# Patient Record
Sex: FEMALE | Race: BLACK OR AFRICAN AMERICAN | NOT HISPANIC OR LATINO | URBAN - METROPOLITAN AREA
[De-identification: names, ages, dates, MRNs, and addresses within clinical notes are randomized per-mention and may not be internally consistent; named-entity substitution may affect disease eponyms.]

---

## 2020-06-11 ENCOUNTER — OFFICE VISIT (OUTPATIENT)
Dept: URBAN - METROPOLITAN AREA CLINIC 82 | Facility: CLINIC | Age: 60
End: 2020-06-11
Payer: COMMERCIAL

## 2020-06-11 DIAGNOSIS — Z12.11 SCREENING FOR COLON CANCER: ICD-10-CM

## 2020-06-11 DIAGNOSIS — K75.4 AUTOIMMUNE HEPATITIS: ICD-10-CM

## 2020-06-11 PROBLEM — 305058001: Status: ACTIVE | Noted: 2020-06-11

## 2020-06-11 PROCEDURE — 99213 OFFICE O/P EST LOW 20 MIN: CPT | Performed by: INTERNAL MEDICINE

## 2020-06-11 RX ORDER — AZATHIOPRINE 50 MG/1
TAKE 1 TABLET (50 MG) BY ORAL ROUTE ONCE DAILY FOR 30 DAYS TABLET ORAL 1
Qty: 30 | Refills: 6 | Status: ACTIVE | COMMUNITY
Start: 2020-03-10 | End: 2020-10-06

## 2020-06-11 RX ORDER — PREDNISONE 5 MG/1
1 TABLET TABLET ORAL ONCE A DAY
Qty: 30 | Refills: 6

## 2020-06-11 RX ORDER — SODIUM, POTASSIUM,MAG SULFATES 17.5-3.13G
354 ML SOLUTION, RECONSTITUTED, ORAL ORAL
Qty: 354 ML | Refills: 0 | OUTPATIENT
Start: 2020-06-11

## 2020-06-11 RX ORDER — AZATHIOPRINE 50 MG/1
TAKE 1 TABLET (50 MG) BY ORAL ROUTE ONCE DAILY FOR 30 DAYS TABLET ORAL 1
Qty: 30 | Refills: 6
Start: 2020-03-10

## 2020-06-11 RX ORDER — AMLODIPINE BESYLATE 10 MG/1
TAKE 1 TABLET (10 MG) BY ORAL ROUTE ONCE DAILY TABLET ORAL 1
Qty: 0 | Refills: 0 | Status: ACTIVE | COMMUNITY
Start: 1900-01-01

## 2020-06-11 NOTE — HPI-TODAY'S VISIT:
following up today for autoimmune hepatitis.  doing well.  good energy.  compliant with medications.  has put on 20 lbs since last visit.

## 2020-06-15 LAB
A/G RATIO: 1.4
ACTIN (SMOOTH MUSCLE) ANTIBODY: 37
ALBUMIN: 4.2
ALKALINE PHOSPHATASE: 79
AST (SGOT): 19
BASO (ABSOLUTE): 0
BASOS: 1
BILIRUBIN, TOTAL: 0.7
BUN/CREATININE RATIO: 11
BUN: 10
CALCIUM: 9.3
CARBON DIOXIDE, TOTAL: 24
CHLORIDE: 102
COMMENT:: (no result)
CREATININE: 0.94
EGFR IF AFRICN AM: 76
EGFR IF NONAFRICN AM: 66
EOS (ABSOLUTE): 0.1
EOS: 1
GLOBULIN, TOTAL: 3.1
GLUCOSE: 106
HBSAG SCREEN: NEGATIVE
HCV AB: <0.1
HEMATOCRIT: 37.7
HEMATOLOGY COMMENTS:: (no result)
HEMOGLOBIN: 12.2
HEP B SURFACE AB, QUAL: REACTIVE
IGG, SUBCLASS 1: 963
IGG, SUBCLASS 2: 165
IGG, SUBCLASS 3: 176
IGG, SUBCLASS 4: 60
IMMATURE CELLS: (no result)
IMMATURE GRANS (ABS): 0
IMMATURE GRANULOCYTES: 0
IMMUNOGLOBULIN G, QN, SERUM: 1350
LYMPHS (ABSOLUTE): 1.1
LYMPHS: 26
MCH: 25.3
MCHC: 32.4
MCV: 78
MONOCYTES(ABSOLUTE): 0.5
MONOCYTES: 11
NEUTROPHILS (ABSOLUTE): 2.7
NEUTROPHILS: 61
NRBC: (no result)
PLATELETS: 316
POTASSIUM: 3.9
PROTEIN, TOTAL: 7.3
RBC: 4.83
RDW: 14.1
RPR: NON REACTIVE
SODIUM: 139
WBC: 4.4

## 2020-06-24 ENCOUNTER — TELEPHONE ENCOUNTER (OUTPATIENT)
Dept: URBAN - METROPOLITAN AREA CLINIC 92 | Facility: CLINIC | Age: 60
End: 2020-06-24

## 2020-07-01 ENCOUNTER — CLAIMS CREATED FROM THE CLAIM WINDOW (OUTPATIENT)
Dept: URBAN - METROPOLITAN AREA CLINIC 4 | Facility: CLINIC | Age: 60
End: 2020-07-01
Payer: COMMERCIAL

## 2020-07-01 ENCOUNTER — OFFICE VISIT (OUTPATIENT)
Dept: URBAN - METROPOLITAN AREA SURGERY CENTER 13 | Facility: SURGERY CENTER | Age: 60
End: 2020-07-01
Payer: COMMERCIAL

## 2020-07-01 DIAGNOSIS — Z12.11 COLON CANCER SCREENING: ICD-10-CM

## 2020-07-01 DIAGNOSIS — D12.5 ADENOMATOUS POLYP OF SIGMOID COLON: ICD-10-CM

## 2020-07-01 DIAGNOSIS — D12.1 BENIGN NEOPLASM OF APPENDIX: ICD-10-CM

## 2020-07-01 DIAGNOSIS — D12.0 BENIGN NEOPLASM OF CECUM: ICD-10-CM

## 2020-07-01 DIAGNOSIS — D12.3 ADENOMA OF TRANSVERSE COLON: ICD-10-CM

## 2020-07-01 DIAGNOSIS — D12.3 BENIGN NEOPLASM OF TRANSVERSE COLON: ICD-10-CM

## 2020-07-01 DIAGNOSIS — D12.5 BENIGN NEOPLASM OF SIGMOID COLON: ICD-10-CM

## 2020-07-01 PROCEDURE — 45380 COLONOSCOPY AND BIOPSY: CPT | Performed by: INTERNAL MEDICINE

## 2020-07-01 PROCEDURE — G8907 PT DOC NO EVENTS ON DISCHARG: HCPCS | Performed by: INTERNAL MEDICINE

## 2020-07-01 PROCEDURE — G9933 CANC DETECTD DURING COL SCRN: HCPCS | Performed by: INTERNAL MEDICINE

## 2020-07-01 PROCEDURE — 88305 TISSUE EXAM BY PATHOLOGIST: CPT | Performed by: PATHOLOGY

## 2020-07-01 PROCEDURE — 45385 COLONOSCOPY W/LESION REMOVAL: CPT | Performed by: INTERNAL MEDICINE

## 2021-10-15 ENCOUNTER — OFFICE VISIT (OUTPATIENT)
Dept: URBAN - METROPOLITAN AREA CLINIC 42 | Facility: CLINIC | Age: 61
End: 2021-10-15
Payer: COMMERCIAL

## 2021-10-15 ENCOUNTER — WEB ENCOUNTER (OUTPATIENT)
Dept: URBAN - METROPOLITAN AREA CLINIC 42 | Facility: CLINIC | Age: 61
End: 2021-10-15

## 2021-10-15 VITALS
TEMPERATURE: 97.3 F | WEIGHT: 184 LBS | HEIGHT: 59 IN | SYSTOLIC BLOOD PRESSURE: 148 MMHG | DIASTOLIC BLOOD PRESSURE: 84 MMHG | HEART RATE: 87 BPM | BODY MASS INDEX: 37.09 KG/M2 | RESPIRATION RATE: 20 BRPM

## 2021-10-15 DIAGNOSIS — K75.4 AUTOIMMUNE HEPATITIS: ICD-10-CM

## 2021-10-15 PROCEDURE — 99214 OFFICE O/P EST MOD 30 MIN: CPT | Performed by: INTERNAL MEDICINE

## 2021-10-15 RX ORDER — SODIUM, POTASSIUM,MAG SULFATES 17.5-3.13G
354 ML SOLUTION, RECONSTITUTED, ORAL ORAL
Qty: 354 ML | Refills: 0 | Status: ACTIVE | COMMUNITY
Start: 2020-06-11

## 2021-10-15 RX ORDER — AZATHIOPRINE 50 MG
AS DIRECTED TABLET ORAL DAILY
Qty: 90 | Refills: 3 | OUTPATIENT

## 2021-10-15 RX ORDER — AZATHIOPRINE 50 MG/1
TAKE 1 TABLET (50 MG) BY ORAL ROUTE ONCE DAILY FOR 30 DAYS TABLET ORAL 1
Qty: 30 | Refills: 6 | Status: ACTIVE | COMMUNITY
Start: 2020-03-10

## 2021-10-15 RX ORDER — AMLODIPINE BESYLATE 10 MG/1
TAKE 1 TABLET (10 MG) BY ORAL ROUTE ONCE DAILY TABLET ORAL 1
Qty: 0 | Refills: 0 | Status: ACTIVE | COMMUNITY
Start: 1900-01-01

## 2021-10-15 RX ORDER — PREDNISONE 10 MG/1
4 TABS/DAYX7 DAYS, THEN 3 TABS/DAY X 7 DAYS, THEN 2 TABS/DAY X 7 DAYS, THEN 1 TAB/DAY X 7 DAYS TABLET ORAL ONCE A DAY
Qty: 70 | Refills: 0 | OUTPATIENT
Start: 2021-10-15 | End: 2021-11-11

## 2021-10-15 RX ORDER — PREDNISONE 5 MG/1
1 TABLET TABLET ORAL ONCE A DAY
Qty: 30 | Refills: 6 | Status: ACTIVE | COMMUNITY

## 2021-10-15 NOTE — HPI-TODAY'S VISIT:
The patient is a 61 yo female following up for autoimmune hepatitis.  Her disease was well controlled on imuran but she did not follow-up and subsequently ran out of drugs.  Recently, Dr. Summers checked her labs and she again had elevated liver enzymes and she is following up again with me.

## 2021-11-05 ENCOUNTER — TELEPHONE ENCOUNTER (OUTPATIENT)
Dept: URBAN - METROPOLITAN AREA CLINIC 92 | Facility: CLINIC | Age: 61
End: 2021-11-05

## 2021-11-05 ENCOUNTER — TELEPHONE ENCOUNTER (OUTPATIENT)
Dept: URBAN - METROPOLITAN AREA CLINIC 42 | Facility: CLINIC | Age: 61
End: 2021-11-05

## 2021-11-05 LAB
A/G RATIO: 1.1
ACTIN (SMOOTH MUSCLE) ANTIBODY: 35
ALBUMIN: 3.7
ALKALINE PHOSPHATASE: 193
ALT (SGPT): 108
AST (SGOT): 45
BILIRUBIN, TOTAL: 1.6
BUN/CREATININE RATIO: 18
BUN: 16
CALCIUM: 9.1
CARBON DIOXIDE, TOTAL: 25
CHLORIDE: 98
CREATININE: 0.88
EGFR IF AFRICN AM: 81
EGFR IF NONAFRICN AM: 71
GLOBULIN, TOTAL: 3.3
GLUCOSE: 249
POTASSIUM: 4
PROTEIN, TOTAL: 7
SODIUM: 136

## 2021-11-05 RX ORDER — PREDNISONE 5 MG/1
1 TABLET TABLET ORAL ONCE A DAY
Qty: 30 | Refills: 0
End: 2021-12-05

## 2021-12-03 ENCOUNTER — OFFICE VISIT (OUTPATIENT)
Dept: URBAN - METROPOLITAN AREA CLINIC 42 | Facility: CLINIC | Age: 61
End: 2021-12-03
Payer: COMMERCIAL

## 2021-12-03 VITALS
HEIGHT: 59 IN | DIASTOLIC BLOOD PRESSURE: 84 MMHG | WEIGHT: 184 LBS | SYSTOLIC BLOOD PRESSURE: 138 MMHG | RESPIRATION RATE: 24 BRPM | TEMPERATURE: 97.7 F | BODY MASS INDEX: 37.09 KG/M2 | HEART RATE: 99 BPM

## 2021-12-03 DIAGNOSIS — K75.4 AUTOIMMUNE HEPATITIS: ICD-10-CM

## 2021-12-03 PROCEDURE — 99213 OFFICE O/P EST LOW 20 MIN: CPT | Performed by: INTERNAL MEDICINE

## 2021-12-03 RX ORDER — AZATHIOPRINE 50 MG
AS DIRECTED TABLET ORAL DAILY
Qty: 90 | Refills: 3 | Status: ACTIVE | COMMUNITY

## 2021-12-03 RX ORDER — PREDNISONE 5 MG/1
1 TABLET TABLET ORAL ONCE A DAY
Qty: 30 | Refills: 0 | Status: ACTIVE | COMMUNITY
End: 2021-12-05

## 2021-12-03 RX ORDER — PREDNISONE 5 MG/1
1 TABLET TABLET ORAL ONCE A DAY
Qty: 30 | Refills: 3

## 2021-12-03 RX ORDER — AZATHIOPRINE 50 MG
AS DIRECTED TABLET ORAL DAILY
Qty: 90 | Refills: 3
End: 2022-11-28

## 2021-12-03 RX ORDER — SODIUM, POTASSIUM,MAG SULFATES 17.5-3.13G
354 ML SOLUTION, RECONSTITUTED, ORAL ORAL
Qty: 354 ML | Refills: 0 | Status: ACTIVE | COMMUNITY
Start: 2020-06-11

## 2021-12-03 RX ORDER — AZATHIOPRINE 50 MG/1
TAKE 1 TABLET (50 MG) BY ORAL ROUTE ONCE DAILY FOR 30 DAYS TABLET ORAL 1
Qty: 30 | Refills: 6 | Status: ACTIVE | COMMUNITY
Start: 2020-03-10

## 2021-12-03 RX ORDER — AMLODIPINE BESYLATE 10 MG/1
TAKE 1 TABLET (10 MG) BY ORAL ROUTE ONCE DAILY TABLET ORAL 1
Qty: 0 | Refills: 0 | Status: ACTIVE | COMMUNITY
Start: 1900-01-01

## 2021-12-03 NOTE — HPI-TODAY'S VISIT:
The patient is a 61 yo female following up for autoimmune hepatitis.  Currently taking imuran and prednisone and feels muh better.  Jaundice has resolved.

## 2022-02-24 ENCOUNTER — LAB OUTSIDE AN ENCOUNTER (OUTPATIENT)
Dept: URBAN - METROPOLITAN AREA CLINIC 42 | Facility: CLINIC | Age: 62
End: 2022-02-24

## 2022-02-25 LAB
A/G RATIO: 1.1
ALBUMIN: 3.9
ALKALINE PHOSPHATASE: 105
ALT (SGPT): 14
AST (SGOT): 15
BILIRUBIN, TOTAL: 0.9
BUN/CREATININE RATIO: 12
BUN: 9
CALCIUM: 9.8
CARBON DIOXIDE, TOTAL: 22
CHLORIDE: 101
CREATININE: 0.73
EGFR IF AFRICN AM: 101
EGFR IF NONAFRICN AM: 88
GLOBULIN, TOTAL: 3.7
GLUCOSE: 136
POTASSIUM: 4.5
PROTEIN, TOTAL: 7.6
SODIUM: 140

## 2022-03-01 ENCOUNTER — TELEPHONE ENCOUNTER (OUTPATIENT)
Dept: URBAN - METROPOLITAN AREA CLINIC 92 | Facility: CLINIC | Age: 62
End: 2022-03-01

## 2022-03-11 ENCOUNTER — OFFICE VISIT (OUTPATIENT)
Dept: URBAN - METROPOLITAN AREA CLINIC 42 | Facility: CLINIC | Age: 62
End: 2022-03-11
Payer: COMMERCIAL

## 2022-03-11 VITALS
SYSTOLIC BLOOD PRESSURE: 125 MMHG | WEIGHT: 181 LBS | DIASTOLIC BLOOD PRESSURE: 81 MMHG | RESPIRATION RATE: 20 BRPM | HEART RATE: 78 BPM | BODY MASS INDEX: 36.49 KG/M2 | HEIGHT: 59 IN | TEMPERATURE: 97.5 F

## 2022-03-11 DIAGNOSIS — K75.4 AUTOIMMUNE HEPATITIS: ICD-10-CM

## 2022-03-11 PROCEDURE — 99214 OFFICE O/P EST MOD 30 MIN: CPT | Performed by: INTERNAL MEDICINE

## 2022-03-11 RX ORDER — AZATHIOPRINE 50 MG
AS DIRECTED TABLET ORAL DAILY
Qty: 90 | Refills: 3
End: 2023-03-06

## 2022-03-11 RX ORDER — AZATHIOPRINE 50 MG
AS DIRECTED TABLET ORAL DAILY
Qty: 90 | Refills: 3 | Status: ACTIVE | COMMUNITY
End: 2022-11-28

## 2022-03-11 RX ORDER — AZATHIOPRINE 50 MG/1
TAKE 1 TABLET (50 MG) BY ORAL ROUTE ONCE DAILY FOR 30 DAYS TABLET ORAL 1
Qty: 30 | Refills: 6 | Status: ACTIVE | COMMUNITY
Start: 2020-03-10

## 2022-03-11 RX ORDER — SODIUM, POTASSIUM,MAG SULFATES 17.5-3.13G
354 ML SOLUTION, RECONSTITUTED, ORAL ORAL
Qty: 354 ML | Refills: 0 | Status: ACTIVE | COMMUNITY
Start: 2020-06-11

## 2022-03-11 RX ORDER — PREDNISONE 5 MG/1
1 TABLET TABLET ORAL ONCE A DAY
Qty: 30 | Refills: 3 | Status: ACTIVE | COMMUNITY

## 2022-03-11 RX ORDER — AMLODIPINE BESYLATE 10 MG/1
TAKE 1 TABLET (10 MG) BY ORAL ROUTE ONCE DAILY TABLET ORAL 1
Qty: 0 | Refills: 0 | Status: ACTIVE | COMMUNITY
Start: 1900-01-01

## 2022-03-11 NOTE — HPI-TODAY'S VISIT:
The patient is a 59 yo female following up for autoimmune hepatitis.  Currently taking imuran and prednisone and feels muh better.  Jaundice has resolved.

## 2022-04-28 LAB
A/G RATIO: 1.2
ALBUMIN: 4.6
ALKALINE PHOSPHATASE: 110
ALT (SGPT): 20
AST (SGOT): 29
BILIRUBIN, TOTAL: 0.7
BUN/CREATININE RATIO: 13
BUN: 10
CALCIUM: 9.6
CARBON DIOXIDE, TOTAL: 22
CHLORIDE: 102
CREATININE: 0.75
EGFR: 89
GLOBULIN, TOTAL: 3.9
GLUCOSE: 100
POTASSIUM: 4.2
PROTEIN, TOTAL: 8.5
SODIUM: 141

## 2022-05-02 ENCOUNTER — TELEPHONE ENCOUNTER (OUTPATIENT)
Dept: URBAN - METROPOLITAN AREA CLINIC 92 | Facility: CLINIC | Age: 62
End: 2022-05-02

## 2022-05-06 ENCOUNTER — OFFICE VISIT (OUTPATIENT)
Dept: URBAN - METROPOLITAN AREA CLINIC 42 | Facility: CLINIC | Age: 62
End: 2022-05-06
Payer: COMMERCIAL

## 2022-05-06 VITALS
BODY MASS INDEX: 36.97 KG/M2 | RESPIRATION RATE: 20 BRPM | DIASTOLIC BLOOD PRESSURE: 86 MMHG | HEIGHT: 59 IN | WEIGHT: 183.4 LBS | TEMPERATURE: 97.5 F | SYSTOLIC BLOOD PRESSURE: 136 MMHG | HEART RATE: 78 BPM

## 2022-05-06 DIAGNOSIS — K75.4 AUTOIMMUNE HEPATITIS: ICD-10-CM

## 2022-05-06 PROCEDURE — 99213 OFFICE O/P EST LOW 20 MIN: CPT | Performed by: INTERNAL MEDICINE

## 2022-05-06 RX ORDER — AMLODIPINE BESYLATE 10 MG/1
TAKE 1 TABLET (10 MG) BY ORAL ROUTE ONCE DAILY TABLET ORAL 1
Qty: 0 | Refills: 0 | Status: ACTIVE | COMMUNITY
Start: 1900-01-01

## 2022-05-06 RX ORDER — AZATHIOPRINE 50 MG/1
TAKE 1 TABLET (50 MG) BY ORAL ROUTE ONCE DAILY FOR 30 DAYS TABLET ORAL 1
Qty: 30 | Refills: 6 | Status: ACTIVE | COMMUNITY
Start: 2020-03-10

## 2022-05-06 RX ORDER — AZATHIOPRINE 50 MG
AS DIRECTED TABLET ORAL DAILY
Qty: 90 | Refills: 3

## 2022-05-06 RX ORDER — AZATHIOPRINE 50 MG
AS DIRECTED TABLET ORAL DAILY
Qty: 90 | Refills: 3 | Status: ACTIVE | COMMUNITY
End: 2023-03-06

## 2022-05-06 RX ORDER — PREDNISONE 5 MG/1
1 TABLET TABLET ORAL ONCE A DAY
Qty: 30 | Refills: 3 | Status: ACTIVE | COMMUNITY

## 2022-05-06 RX ORDER — SODIUM, POTASSIUM,MAG SULFATES 17.5-3.13G
354 ML SOLUTION, RECONSTITUTED, ORAL ORAL
Qty: 354 ML | Refills: 0 | Status: ACTIVE | COMMUNITY
Start: 2020-06-11

## 2022-11-04 ENCOUNTER — OFFICE VISIT (OUTPATIENT)
Dept: URBAN - METROPOLITAN AREA CLINIC 42 | Facility: CLINIC | Age: 62
End: 2022-11-04
Payer: COMMERCIAL

## 2022-11-04 VITALS
TEMPERATURE: 97.3 F | SYSTOLIC BLOOD PRESSURE: 151 MMHG | DIASTOLIC BLOOD PRESSURE: 87 MMHG | RESPIRATION RATE: 20 BRPM | BODY MASS INDEX: 35.68 KG/M2 | WEIGHT: 177 LBS | HEART RATE: 78 BPM | HEIGHT: 59 IN

## 2022-11-04 DIAGNOSIS — K75.4 AUTOIMMUNE HEPATITIS: ICD-10-CM

## 2022-11-04 PROCEDURE — 99214 OFFICE O/P EST MOD 30 MIN: CPT | Performed by: INTERNAL MEDICINE

## 2022-11-04 RX ORDER — PREDNISONE 5 MG/1
1 TABLET TABLET ORAL ONCE A DAY
Qty: 30 | Refills: 3 | Status: ACTIVE | COMMUNITY

## 2022-11-04 RX ORDER — AZATHIOPRINE 50 MG
AS DIRECTED TABLET ORAL DAILY
Qty: 90 | Refills: 3 | Status: ACTIVE | COMMUNITY

## 2022-11-04 RX ORDER — AZATHIOPRINE 50 MG
AS DIRECTED TABLET ORAL DAILY
Qty: 90 | Refills: 3
End: 2023-10-30

## 2022-11-04 RX ORDER — AMLODIPINE BESYLATE 10 MG/1
TAKE 1 TABLET (10 MG) BY ORAL ROUTE ONCE DAILY TABLET ORAL 1
Qty: 0 | Refills: 0 | Status: ACTIVE | COMMUNITY
Start: 1900-01-01

## 2022-11-04 RX ORDER — AZATHIOPRINE 50 MG/1
TAKE 1 TABLET (50 MG) BY ORAL ROUTE ONCE DAILY FOR 30 DAYS TABLET ORAL 1
Qty: 30 | Refills: 6 | Status: ACTIVE | COMMUNITY
Start: 2020-03-10

## 2022-11-04 NOTE — HPI-TODAY'S VISIT:
The patient is a 61 yo female following up for autoimmune hepatitis.  was off of prednisone but somehow went off of imuran on her own without notifying me.  Apparently said that the pharmacy did not have her prescription and she thought that I took her off of medications despite clear documentation on the last visit that I wanted her to continue meds.  Jaundice has resolved.

## 2022-11-16 ENCOUNTER — TELEPHONE ENCOUNTER (OUTPATIENT)
Dept: URBAN - METROPOLITAN AREA CLINIC 42 | Facility: CLINIC | Age: 62
End: 2022-11-16

## 2022-11-18 ENCOUNTER — TELEPHONE ENCOUNTER (OUTPATIENT)
Dept: URBAN - METROPOLITAN AREA CLINIC 92 | Facility: CLINIC | Age: 62
End: 2022-11-18

## 2022-11-18 PROBLEM — 408335007: Status: ACTIVE | Noted: 2020-06-11

## 2022-11-18 LAB
A/G RATIO: 1
ALBUMIN: 4.4
ALKALINE PHOSPHATASE: 243
ALT (SGPT): 440
AST (SGOT): 428
BILIRUBIN, TOTAL: 1.2
BUN/CREATININE RATIO: 12
BUN: 9
CALCIUM: 9.2
CARBON DIOXIDE, TOTAL: 21
CHLORIDE: 100
CREATININE: 0.73
EGFR: 92
GLOBULIN, TOTAL: 4.6
GLUCOSE: 88
HEMATOCRIT: 38.7
HEMOGLOBIN: 12.6
INR: 1.1
MCH: 25.8
MCHC: 32.6
MCV: 79
NRBC: (no result)
PLATELETS: 320
POTASSIUM: 4
PROTEIN, TOTAL: 9
PROTHROMBIN TIME: 11.3
RBC: 4.89
RDW: 14
SODIUM: 138
WBC: 7

## 2022-11-23 ENCOUNTER — TELEPHONE ENCOUNTER (OUTPATIENT)
Dept: URBAN - METROPOLITAN AREA CLINIC 92 | Facility: CLINIC | Age: 62
End: 2022-11-23

## 2022-12-29 LAB
A/G RATIO: 0.7
ALBUMIN: 3.9
ALKALINE PHOSPHATASE: 161
ALT (SGPT): 129
AST (SGOT): 156
BILIRUBIN, TOTAL: 1
BUN/CREATININE RATIO: 17
BUN: 13
CALCIUM: 9.4
CARBON DIOXIDE, TOTAL: 22
CHLORIDE: 102
CREATININE: 0.76
EGFR: 87
GLOBULIN, TOTAL: 5.7
GLUCOSE: 79
POTASSIUM: 4.2
PROTEIN, TOTAL: 9.6
SODIUM: 137

## 2023-01-05 ENCOUNTER — TELEPHONE ENCOUNTER (OUTPATIENT)
Dept: URBAN - METROPOLITAN AREA CLINIC 92 | Facility: CLINIC | Age: 63
End: 2023-01-05

## 2023-02-02 ENCOUNTER — TELEPHONE ENCOUNTER (OUTPATIENT)
Dept: URBAN - METROPOLITAN AREA CLINIC 42 | Facility: CLINIC | Age: 63
End: 2023-02-02

## 2023-02-02 RX ORDER — AZATHIOPRINE 50 MG
AS DIRECTED TABLET ORAL DAILY
Qty: 90 | Refills: 3
End: 2024-01-28

## 2023-04-03 ENCOUNTER — TELEPHONE ENCOUNTER (OUTPATIENT)
Dept: URBAN - METROPOLITAN AREA CLINIC 42 | Facility: CLINIC | Age: 63
End: 2023-04-03

## 2023-04-19 ENCOUNTER — OFFICE VISIT (OUTPATIENT)
Dept: URBAN - METROPOLITAN AREA CLINIC 42 | Facility: CLINIC | Age: 63
End: 2023-04-19

## 2023-05-01 ENCOUNTER — OFFICE VISIT (OUTPATIENT)
Dept: URBAN - METROPOLITAN AREA CLINIC 42 | Facility: CLINIC | Age: 63
End: 2023-05-01
Payer: COMMERCIAL

## 2023-05-01 VITALS
DIASTOLIC BLOOD PRESSURE: 79 MMHG | HEIGHT: 59 IN | BODY MASS INDEX: 35.88 KG/M2 | SYSTOLIC BLOOD PRESSURE: 139 MMHG | WEIGHT: 178 LBS | HEART RATE: 76 BPM | TEMPERATURE: 97.3 F | RESPIRATION RATE: 20 BRPM

## 2023-05-01 DIAGNOSIS — K75.4 AUTOIMMUNE HEPATITIS: ICD-10-CM

## 2023-05-01 DIAGNOSIS — C21.0 ANAL SQUAMOUS CELL CARCINOMA: ICD-10-CM

## 2023-05-01 PROBLEM — 363490009: Status: ACTIVE | Noted: 2023-05-01

## 2023-05-01 PROCEDURE — 99213 OFFICE O/P EST LOW 20 MIN: CPT | Performed by: INTERNAL MEDICINE

## 2023-05-01 RX ORDER — AZATHIOPRINE 50 MG/1
TAKE 1 TABLET (50 MG) BY ORAL ROUTE ONCE DAILY FOR 30 DAYS TABLET ORAL 1
Qty: 30 | Refills: 6 | Status: ACTIVE | COMMUNITY
Start: 2020-03-10

## 2023-05-01 RX ORDER — PREDNISONE 5 MG/1
1 TABLET TABLET ORAL ONCE A DAY
Qty: 30 | Refills: 3 | Status: ACTIVE | COMMUNITY

## 2023-05-01 RX ORDER — AZATHIOPRINE 50 MG
AS DIRECTED TABLET ORAL DAILY
Qty: 90 | Refills: 3 | Status: ACTIVE | COMMUNITY
End: 2024-01-28

## 2023-05-01 RX ORDER — AMLODIPINE BESYLATE 10 MG/1
TAKE 1 TABLET (10 MG) BY ORAL ROUTE ONCE DAILY TABLET ORAL 1
Qty: 0 | Refills: 0 | Status: ACTIVE | COMMUNITY
Start: 1900-01-01

## 2023-05-01 NOTE — HPI-TODAY'S VISIT:
The patient is a 63 yo female with autoimmune hepatitis who is following up today.  Currently on imuran but diagnosed with anal CA recently and has plans for chemo/xrt.

## 2023-05-31 ENCOUNTER — OFFICE VISIT (OUTPATIENT)
Dept: URBAN - METROPOLITAN AREA CLINIC 42 | Facility: CLINIC | Age: 63
End: 2023-05-31
Payer: COMMERCIAL

## 2023-05-31 VITALS
SYSTOLIC BLOOD PRESSURE: 140 MMHG | RESPIRATION RATE: 20 BRPM | HEART RATE: 76 BPM | BODY MASS INDEX: 35.88 KG/M2 | HEIGHT: 59 IN | TEMPERATURE: 97.9 F | WEIGHT: 178 LBS | DIASTOLIC BLOOD PRESSURE: 80 MMHG

## 2023-05-31 DIAGNOSIS — K75.4 AUTOIMMUNE HEPATITIS: ICD-10-CM

## 2023-05-31 DIAGNOSIS — C21.0 ANAL SQUAMOUS CELL CARCINOMA: ICD-10-CM

## 2023-05-31 PROCEDURE — 99213 OFFICE O/P EST LOW 20 MIN: CPT | Performed by: INTERNAL MEDICINE

## 2023-05-31 RX ORDER — AZATHIOPRINE 50 MG/1
TAKE 1 TABLET (50 MG) BY ORAL ROUTE ONCE DAILY FOR 30 DAYS TABLET ORAL 1
Qty: 30 | Refills: 6 | Status: ACTIVE | COMMUNITY
Start: 2020-03-10

## 2023-05-31 RX ORDER — PREDNISONE 5 MG/1
1 TABLET TABLET ORAL ONCE A DAY
Qty: 30 | Refills: 3 | Status: ACTIVE | COMMUNITY

## 2023-05-31 RX ORDER — AMLODIPINE BESYLATE 10 MG/1
TAKE 1 TABLET (10 MG) BY ORAL ROUTE ONCE DAILY TABLET ORAL 1
Qty: 0 | Refills: 0 | Status: ACTIVE | COMMUNITY
Start: 1900-01-01

## 2023-05-31 RX ORDER — PREDNISONE 5 MG/1
1 TABLET TABLET ORAL ONCE A DAY
Qty: 30 | Refills: 3
End: 2023-09-28

## 2023-05-31 RX ORDER — AZATHIOPRINE 50 MG/1
TAKE 1 TABLET (50 MG) BY ORAL ROUTE ONCE DAILY FOR 30 DAYS TABLET ORAL 1
Qty: 30 | Refills: 6
Start: 2020-03-10

## 2023-05-31 RX ORDER — AZATHIOPRINE 50 MG
AS DIRECTED TABLET ORAL DAILY
Qty: 90 | Refills: 3 | Status: ACTIVE | COMMUNITY
End: 2024-01-28

## 2023-05-31 NOTE — HPI-TODAY'S VISIT:
The patient is a 61 yo female with autoimmune hepatitis who is following up today.  Currently on imuran but diagnosed with anal CA recently and is getting chemo/xrt.  Currently doing well otherwise.

## 2023-08-22 LAB
A/G RATIO: 1
ALBUMIN: 4.1
ALKALINE PHOSPHATASE: 79
ALT (SGPT): 10
AST (SGOT): 24
BILIRUBIN, TOTAL: 0.8
BUN/CREATININE RATIO: 15
BUN: 12
CALCIUM: 9.3
CARBON DIOXIDE, TOTAL: 21
CHLORIDE: 105
CREATININE: 0.78
EGFR: 85
GLOBULIN, TOTAL: 4
GLUCOSE: 79
POTASSIUM: 4.5
PROTEIN, TOTAL: 8.1
SODIUM: 138

## 2023-08-25 ENCOUNTER — TELEPHONE ENCOUNTER (OUTPATIENT)
Dept: URBAN - METROPOLITAN AREA CLINIC 42 | Facility: CLINIC | Age: 63
End: 2023-08-25

## 2023-09-01 ENCOUNTER — OFFICE VISIT (OUTPATIENT)
Dept: URBAN - METROPOLITAN AREA CLINIC 42 | Facility: CLINIC | Age: 63
End: 2023-09-01
Payer: COMMERCIAL

## 2023-09-01 VITALS
BODY MASS INDEX: 34.68 KG/M2 | HEIGHT: 59 IN | RESPIRATION RATE: 20 BRPM | DIASTOLIC BLOOD PRESSURE: 79 MMHG | SYSTOLIC BLOOD PRESSURE: 143 MMHG | TEMPERATURE: 97.3 F | HEART RATE: 83 BPM | WEIGHT: 172 LBS

## 2023-09-01 DIAGNOSIS — C21.0 ANAL SQUAMOUS CELL CARCINOMA: ICD-10-CM

## 2023-09-01 DIAGNOSIS — K75.4 AUTOIMMUNE HEPATITIS: ICD-10-CM

## 2023-09-01 PROCEDURE — 99214 OFFICE O/P EST MOD 30 MIN: CPT | Performed by: INTERNAL MEDICINE

## 2023-09-01 RX ORDER — AMLODIPINE BESYLATE 10 MG/1
TAKE 1 TABLET (10 MG) BY ORAL ROUTE ONCE DAILY TABLET ORAL 1
Qty: 0 | Refills: 0 | Status: ACTIVE | COMMUNITY
Start: 1900-01-01

## 2023-09-01 RX ORDER — AZATHIOPRINE 50 MG/1
TAKE 1 TABLET (50 MG) BY ORAL ROUTE ONCE DAILY FOR 30 DAYS TABLET ORAL 1
Qty: 30 | Refills: 6 | Status: ACTIVE | COMMUNITY
Start: 2020-03-10

## 2023-09-01 RX ORDER — AZATHIOPRINE 50 MG
AS DIRECTED TABLET ORAL DAILY
Qty: 90 | Refills: 3 | Status: ACTIVE | COMMUNITY
End: 2024-01-28

## 2023-09-01 RX ORDER — PREDNISONE 5 MG/1
1 TABLET TABLET ORAL ONCE A DAY
Qty: 30 | Refills: 3 | Status: ACTIVE | COMMUNITY
End: 2023-09-28

## 2023-09-01 RX ORDER — AZATHIOPRINE 50 MG/1
TAKE 1 TABLET (50 MG) BY ORAL ROUTE ONCE DAILY FOR 30 DAYS TABLET ORAL 1
Qty: 30 | Refills: 11

## 2023-09-01 NOTE — HPI-TODAY'S VISIT:
The patient is a 63 yo female with autoimmune hepatitis who is following up today.  Currently on imuran but diagnosed with anal CA recently and is getting chemo/xrt.  Currently doing well otherwise.

## 2023-12-07 ENCOUNTER — TELEPHONE ENCOUNTER (OUTPATIENT)
Dept: URBAN - METROPOLITAN AREA CLINIC 42 | Facility: CLINIC | Age: 63
End: 2023-12-07

## 2023-12-08 ENCOUNTER — TELEPHONE ENCOUNTER (OUTPATIENT)
Dept: URBAN - METROPOLITAN AREA CLINIC 42 | Facility: CLINIC | Age: 63
End: 2023-12-08

## 2023-12-08 RX ORDER — PREDNISONE 10 MG/1
4 TABS/DAYX7 DAYS, THEN 3 TABS/DAY X 7 DAYS, THEN 2 TABS/DAY X 7 DAYS, THEN 1 TAB/DAY X 7 DAYS TABLET ORAL ONCE A DAY
Qty: 70 | Refills: 0 | OUTPATIENT
Start: 2023-12-08 | End: 2024-01-05

## 2023-12-23 LAB
A/G RATIO: 0.9
ALBUMIN: 3.8
ALKALINE PHOSPHATASE: 145
ALT (SGPT): 30
AST (SGOT): 18
BILIRUBIN, TOTAL: 0.4
BUN/CREATININE RATIO: 16
BUN: 13
CALCIUM: 8.9
CARBON DIOXIDE, TOTAL: 22
CHLORIDE: 101
CREATININE: 0.79
EGFR: 83
GLOBULIN, TOTAL: 4.2
GLUCOSE: 96
POTASSIUM: 4.5
PROTEIN, TOTAL: 8
SODIUM: 137

## 2023-12-29 ENCOUNTER — TELEPHONE ENCOUNTER (OUTPATIENT)
Dept: URBAN - METROPOLITAN AREA CLINIC 42 | Facility: CLINIC | Age: 63
End: 2023-12-29

## 2024-01-10 ENCOUNTER — TELEPHONE ENCOUNTER (OUTPATIENT)
Dept: URBAN - METROPOLITAN AREA CLINIC 42 | Facility: CLINIC | Age: 64
End: 2024-01-10

## 2024-01-10 LAB
A/G RATIO: 1.1
ALBUMIN: 4
ALKALINE PHOSPHATASE: 92
ALT (SGPT): 32
AST (SGOT): 35
BILIRUBIN, TOTAL: 0.9
BUN/CREATININE RATIO: 12
BUN: 10
CALCIUM: 9.2
CARBON DIOXIDE, TOTAL: 22
CHLORIDE: 102
CREATININE: 0.82
EGFR: 79
GLOBULIN, TOTAL: 3.8
GLUCOSE: 91
POTASSIUM: 4.4
PROTEIN, TOTAL: 7.8
SODIUM: 141

## 2024-01-25 ENCOUNTER — LAB OUTSIDE AN ENCOUNTER (OUTPATIENT)
Dept: URBAN - METROPOLITAN AREA CLINIC 42 | Facility: CLINIC | Age: 64
End: 2024-01-25

## 2024-01-26 ENCOUNTER — TELEPHONE ENCOUNTER (OUTPATIENT)
Dept: URBAN - METROPOLITAN AREA CLINIC 42 | Facility: CLINIC | Age: 64
End: 2024-01-26

## 2024-01-26 LAB
A/G RATIO: 1.1
ALBUMIN: 4.3
ALKALINE PHOSPHATASE: 85
ALT (SGPT): 22
AST (SGOT): 32
BILIRUBIN, TOTAL: 0.7
BUN/CREATININE RATIO: 11
BUN: 8
CALCIUM: 9.6
CARBON DIOXIDE, TOTAL: 21
CHLORIDE: 99
CREATININE: 0.72
EGFR: 93
GLOBULIN, TOTAL: 3.9
GLUCOSE: 72
POTASSIUM: 4.1
PROTEIN, TOTAL: 8.2
SODIUM: 140

## 2024-03-01 ENCOUNTER — OV EP (OUTPATIENT)
Dept: URBAN - METROPOLITAN AREA CLINIC 42 | Facility: CLINIC | Age: 64
End: 2024-03-01
Payer: COMMERCIAL

## 2024-03-01 VITALS
HEIGHT: 59 IN | RESPIRATION RATE: 20 BRPM | BODY MASS INDEX: 35.48 KG/M2 | SYSTOLIC BLOOD PRESSURE: 147 MMHG | DIASTOLIC BLOOD PRESSURE: 80 MMHG | HEART RATE: 74 BPM | TEMPERATURE: 97.3 F | WEIGHT: 176 LBS

## 2024-03-01 DIAGNOSIS — R74.8 ELEVATED LIVER ENZYMES: ICD-10-CM

## 2024-03-01 DIAGNOSIS — C21.0 ANAL CANCER: ICD-10-CM

## 2024-03-01 PROBLEM — 363490009: Status: ACTIVE | Noted: 2024-03-01

## 2024-03-01 PROCEDURE — 99213 OFFICE O/P EST LOW 20 MIN: CPT | Performed by: INTERNAL MEDICINE

## 2024-03-01 RX ORDER — AZATHIOPRINE 50 MG/1
TAKE 1 TABLET (50 MG) BY ORAL ROUTE ONCE DAILY FOR 30 DAYS TABLET ORAL 1
Qty: 30 | Refills: 11
End: 2025-02-24

## 2024-03-01 RX ORDER — AMLODIPINE BESYLATE 10 MG/1
TAKE 1 TABLET (10 MG) BY ORAL ROUTE ONCE DAILY TABLET ORAL 1
Qty: 0 | Refills: 0 | Status: ACTIVE | COMMUNITY
Start: 1900-01-01

## 2024-03-01 RX ORDER — AZATHIOPRINE 50 MG/1
TAKE 1 TABLET (50 MG) BY ORAL ROUTE ONCE DAILY FOR 30 DAYS TABLET ORAL 1
Qty: 30 | Refills: 11 | Status: ACTIVE | COMMUNITY

## 2024-03-01 NOTE — PHYSICAL EXAM HENT:
Head, normocephalic, atraumatic, Face, Face within normal limits, Ears, External ears within normal limits, Nose/Nasopharynx, External nose normal appearance, nares patent, no nasal discharge, Mouth and Throat, Oral cavity appearance normal, Lips, Appearance normal
146

## 2024-03-01 NOTE — HPI-TODAY'S VISIT:
The patient is a 62 yo female with autoimmune hepatitis who is following up today.  Currently on imuran but diagnosed with anal CA recently and is getting chemo/xrt.  Currently doing well otherwise.

## 2024-09-13 ENCOUNTER — OFFICE VISIT (OUTPATIENT)
Dept: URBAN - METROPOLITAN AREA CLINIC 42 | Facility: CLINIC | Age: 64
End: 2024-09-13
Payer: COMMERCIAL

## 2024-09-13 ENCOUNTER — DASHBOARD ENCOUNTERS (OUTPATIENT)
Age: 64
End: 2024-09-13

## 2024-09-13 VITALS
TEMPERATURE: 97.3 F | SYSTOLIC BLOOD PRESSURE: 162 MMHG | HEIGHT: 59 IN | DIASTOLIC BLOOD PRESSURE: 85 MMHG | HEART RATE: 86 BPM | RESPIRATION RATE: 20 BRPM | BODY MASS INDEX: 37.09 KG/M2 | WEIGHT: 184 LBS

## 2024-09-13 DIAGNOSIS — R74.8 ELEVATED LIVER ENZYMES: ICD-10-CM

## 2024-09-13 DIAGNOSIS — K75.4 AUTOIMMUNE HEPATITIS: ICD-10-CM

## 2024-09-13 PROCEDURE — 99214 OFFICE O/P EST MOD 30 MIN: CPT | Performed by: INTERNAL MEDICINE

## 2024-09-13 RX ORDER — AZATHIOPRINE 50 MG/1
TAKE 1 TABLET (50 MG) BY ORAL ROUTE ONCE DAILY FOR 30 DAYS TABLET ORAL 1
Qty: 30 | Refills: 11
End: 2025-09-08

## 2024-09-13 RX ORDER — AMLODIPINE BESYLATE 10 MG/1
TAKE 1 TABLET (10 MG) BY ORAL ROUTE ONCE DAILY TABLET ORAL 1
Qty: 0 | Refills: 0 | Status: ACTIVE | COMMUNITY
Start: 1900-01-01

## 2024-09-13 RX ORDER — AZATHIOPRINE 50 MG/1
TAKE 1 TABLET (50 MG) BY ORAL ROUTE ONCE DAILY FOR 30 DAYS TABLET ORAL 1
Qty: 30 | Refills: 11 | Status: ACTIVE | COMMUNITY
End: 2025-02-24

## 2024-09-13 RX ORDER — ATORVASTATIN CALCIUM 10 MG/1
1 TABLET TABLET, FILM COATED ORAL ONCE A DAY
Status: ACTIVE | COMMUNITY

## 2024-09-13 NOTE — HPI-TODAY'S VISIT:
The patient is a 64 yo female with autoimmune hepatitis who is following up today.  Currently on imuran but diagnosed with anal CA recently and is getting chemo/xrt.  Currently doing well otherwise.  Hwever, recent blood work done by PCP showed leukopenia, anemia, and elevated liver enzymes.

## 2024-09-19 LAB
6 MMP: 575
6 TG: 151
A/G RATIO: 0.8
ACTIN (SMOOTH MUSCLE) ANTIBODY (IGG): 60
ALBUMIN: 3.9
ALKALINE PHOSPHATASE: 111
ALT (SGPT): 90
AST (SGOT): 94
BILIRUBIN, TOTAL: 0.7
BUN/CREATININE RATIO: (no result)
BUN: 10
CALCIUM: 9.1
CARBON DIOXIDE, TOTAL: 28
CHLORIDE: 103
CREATININE: 0.69
EGFR: 96
GLOBULIN, TOTAL: 4.7
GLUCOSE: 132
IFE INTERPRETATION: (no result)
IGG, SUBCLASS 1: 2155
IGG, SUBCLASS 2: 151
IGG, SUBCLASS 3: 188
IGG, SUBCLASS 4: 26.7
IMMUNOGLOBULIN A: 975
IMMUNOGLOBULIN G, QN, SERUM: 2775
IMMUNOGLOBULIN G: 2954
IMMUNOGLOBULIN M: 71
POTASSIUM: 3.9
PROTEIN, TOTAL: 8.6
SODIUM: 138

## 2024-09-23 ENCOUNTER — TELEPHONE ENCOUNTER (OUTPATIENT)
Dept: URBAN - METROPOLITAN AREA CLINIC 42 | Facility: CLINIC | Age: 64
End: 2024-09-23

## 2024-09-23 RX ORDER — PREDNISONE 10 MG/1
4 TABS/DAYX7 DAYS, THEN 3 TABS/DAY X 7 DAYS, THEN 2 TABS/DAY X 7 DAYS, THEN 1 TAB/DAY X 7 DAYS TABLET ORAL ONCE A DAY
Qty: 70 | Refills: 0 | OUTPATIENT
Start: 2024-09-23 | End: 2024-10-21

## 2024-09-23 RX ORDER — AZATHIOPRINE 50 MG/1
TAKE 2 TABLET (50 MG) BY ORAL ROUTE ONCE DAILY FOR 30 DAYS TABLET ORAL 1
Qty: 60 | Refills: 11
End: 2025-09-18

## 2024-10-29 ENCOUNTER — TELEPHONE ENCOUNTER (OUTPATIENT)
Dept: URBAN - METROPOLITAN AREA CLINIC 42 | Facility: CLINIC | Age: 64
End: 2024-10-29

## 2024-11-14 ENCOUNTER — TELEPHONE ENCOUNTER (OUTPATIENT)
Dept: URBAN - METROPOLITAN AREA CLINIC 42 | Facility: CLINIC | Age: 64
End: 2024-11-14

## 2024-11-15 ENCOUNTER — OFFICE VISIT (OUTPATIENT)
Dept: URBAN - METROPOLITAN AREA CLINIC 42 | Facility: CLINIC | Age: 64
End: 2024-11-15
Payer: COMMERCIAL

## 2024-11-15 VITALS
HEART RATE: 79 BPM | WEIGHT: 184 LBS | HEIGHT: 59 IN | BODY MASS INDEX: 37.09 KG/M2 | DIASTOLIC BLOOD PRESSURE: 77 MMHG | TEMPERATURE: 97.5 F | SYSTOLIC BLOOD PRESSURE: 135 MMHG | RESPIRATION RATE: 20 BRPM

## 2024-11-15 DIAGNOSIS — K75.4 AUTOIMMUNE HEPATITIS: ICD-10-CM

## 2024-11-15 PROCEDURE — 99214 OFFICE O/P EST MOD 30 MIN: CPT | Performed by: INTERNAL MEDICINE

## 2024-11-15 RX ORDER — AMLODIPINE BESYLATE 10 MG/1
TAKE 1 TABLET (10 MG) BY ORAL ROUTE ONCE DAILY TABLET ORAL 1
Qty: 0 | Refills: 0 | Status: ACTIVE | COMMUNITY
Start: 1900-01-01

## 2024-11-15 RX ORDER — AZATHIOPRINE 50 MG/1
TAKE 2 TABLET (50 MG) BY ORAL ROUTE ONCE DAILY FOR 30 DAYS TABLET ORAL 1
Qty: 60 | Refills: 11 | Status: ACTIVE | COMMUNITY
End: 2025-09-18

## 2024-11-15 RX ORDER — ATORVASTATIN CALCIUM 10 MG/1
1 TABLET TABLET, FILM COATED ORAL ONCE A DAY
Status: ACTIVE | COMMUNITY

## 2024-11-15 NOTE — HPI-TODAY'S VISIT:
The patient is a 62 yo female with autoimmune hepatitis who is following up today.  Currently on imuran but diagnosed with anal CA recently and is getting chemo/xrt.  Currently doing well otherwise.  Hwever, recent blood work done by PCP showed leukopenia, anemia, and elevated liver enzymes.  11/15/24 Currently doing well.

## 2024-12-18 ENCOUNTER — TELEPHONE ENCOUNTER (OUTPATIENT)
Dept: URBAN - METROPOLITAN AREA CLINIC 42 | Facility: CLINIC | Age: 64
End: 2024-12-18

## 2025-05-16 ENCOUNTER — TELEPHONE ENCOUNTER (OUTPATIENT)
Dept: URBAN - METROPOLITAN AREA CLINIC 42 | Facility: CLINIC | Age: 65
End: 2025-05-16

## 2025-05-16 ENCOUNTER — OFFICE VISIT (OUTPATIENT)
Dept: URBAN - METROPOLITAN AREA CLINIC 42 | Facility: CLINIC | Age: 65
End: 2025-05-16
Payer: COMMERCIAL

## 2025-05-16 DIAGNOSIS — Z09 ENCOUNTER FOR FOLLOW-UP: ICD-10-CM

## 2025-05-16 DIAGNOSIS — Z85.048 HISTORY OF ANAL CANCER: ICD-10-CM

## 2025-05-16 DIAGNOSIS — Z86.0100 HISTORY OF COLON POLYPS: ICD-10-CM

## 2025-05-16 DIAGNOSIS — K75.4 AUTOIMMUNE HEPATITIS: ICD-10-CM

## 2025-05-16 PROBLEM — 112071000119105: Status: ACTIVE | Noted: 2025-05-16

## 2025-05-16 PROCEDURE — 99214 OFFICE O/P EST MOD 30 MIN: CPT | Performed by: INTERNAL MEDICINE

## 2025-05-16 RX ORDER — AZATHIOPRINE 50 MG/1
TAKE 2 TABLET (50 MG) BY ORAL ROUTE ONCE DAILY FOR 30 DAYS TABLET ORAL 1
Qty: 60 | Refills: 11
End: 2026-05-11

## 2025-05-16 RX ORDER — AMLODIPINE BESYLATE 10 MG/1
TAKE 1 TABLET (10 MG) BY ORAL ROUTE ONCE DAILY TABLET ORAL 1
Qty: 0 | Refills: 0 | Status: ACTIVE | COMMUNITY
Start: 1900-01-01

## 2025-05-16 RX ORDER — ATORVASTATIN CALCIUM 10 MG/1
1 TABLET TABLET, FILM COATED ORAL ONCE A DAY
Status: ACTIVE | COMMUNITY

## 2025-05-16 RX ORDER — AZATHIOPRINE 50 MG/1
TAKE 2 TABLET (50 MG) BY ORAL ROUTE ONCE DAILY FOR 30 DAYS TABLET ORAL 1
Qty: 60 | Refills: 11 | Status: ACTIVE | COMMUNITY
End: 2025-09-18

## 2025-05-16 NOTE — HPI-TODAY'S VISIT:
The patient is a 64 yo female with autoimmune hepatitis who is following up today.  Currently on imuran but diagnosed with anal CA recently and is getting chemo/xrt.  Currently doing well otherwise.  Hwever, recent blood work done by PCP showed leukopenia, anemia, and elevated liver enzymes.  11/15/24 Currently doing well.

## 2025-06-17 ENCOUNTER — TELEPHONE ENCOUNTER (OUTPATIENT)
Dept: URBAN - METROPOLITAN AREA CLINIC 42 | Facility: CLINIC | Age: 65
End: 2025-06-17

## 2025-08-04 ENCOUNTER — CLAIMS CREATED FROM THE CLAIM WINDOW (OUTPATIENT)
Dept: URBAN - METROPOLITAN AREA CLINIC 4 | Facility: CLINIC | Age: 65
End: 2025-08-04
Payer: COMMERCIAL

## 2025-08-04 ENCOUNTER — CLAIMS CREATED FROM THE CLAIM WINDOW (OUTPATIENT)
Dept: URBAN - METROPOLITAN AREA SURGERY CENTER 13 | Facility: SURGERY CENTER | Age: 65
End: 2025-08-04
Payer: COMMERCIAL

## 2025-08-04 DIAGNOSIS — K63.5 BENIGN COLON POLYP: ICD-10-CM

## 2025-08-04 DIAGNOSIS — D12.5 ADENOMA OF SIGMOID COLON: ICD-10-CM

## 2025-08-04 DIAGNOSIS — D12.3 ADENOMA OF TRANSVERSE COLON: ICD-10-CM

## 2025-08-04 DIAGNOSIS — Z86.0100 HISTORY OF COLON POLYPS: ICD-10-CM

## 2025-08-04 DIAGNOSIS — D12.5 BENIGN NEOPLASM OF SIGMOID COLON: ICD-10-CM

## 2025-08-04 DIAGNOSIS — D12.3 BENIGN NEOPLASM OF TRANSVERSE COLON: ICD-10-CM

## 2025-08-04 PROCEDURE — 0529F INTRVL 3/>YR PTS CLNSCP DOCD: CPT | Performed by: INTERNAL MEDICINE

## 2025-08-04 PROCEDURE — 00811 ANES LWR INTST NDSC NOS: CPT | Performed by: ANESTHESIOLOGY

## 2025-08-04 PROCEDURE — 45385 COLONOSCOPY W/LESION REMOVAL: CPT | Performed by: INTERNAL MEDICINE

## 2025-08-04 PROCEDURE — 88305 TISSUE EXAM BY PATHOLOGIST: CPT | Performed by: PATHOLOGY

## 2025-08-04 PROCEDURE — 00811 ANES LWR INTST NDSC NOS: CPT | Performed by: ANESTHESIOLOGIST ASSISTANT

## 2025-08-04 RX ORDER — AMLODIPINE BESYLATE 10 MG/1
TAKE 1 TABLET (10 MG) BY ORAL ROUTE ONCE DAILY TABLET ORAL 1
Qty: 0 | Refills: 0 | Status: ACTIVE | COMMUNITY
Start: 1900-01-01

## 2025-08-04 RX ORDER — ATORVASTATIN CALCIUM 10 MG/1
1 TABLET TABLET, FILM COATED ORAL ONCE A DAY
Status: ACTIVE | COMMUNITY

## 2025-08-04 RX ORDER — AZATHIOPRINE 50 MG/1
TAKE 2 TABLET (50 MG) BY ORAL ROUTE ONCE DAILY FOR 30 DAYS TABLET ORAL 1
Qty: 60 | Refills: 11 | Status: ACTIVE | COMMUNITY
End: 2026-05-11